# Patient Record
Sex: MALE | Race: WHITE | Employment: UNEMPLOYED | URBAN - METROPOLITAN AREA
[De-identification: names, ages, dates, MRNs, and addresses within clinical notes are randomized per-mention and may not be internally consistent; named-entity substitution may affect disease eponyms.]

---
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
## 2020-01-07 DIAGNOSIS — L70.9 ACNE: ICD-10-CM

## 2020-01-07 RX ORDER — MINOCYCLINE HYDROCHLORIDE 50 MG/1
50 TABLET ORAL 2 TIMES DAILY
Qty: 60 TABLET | Refills: 0 | Status: SHIPPED | OUTPATIENT
Start: 2020-01-07 | End: 2020-01-08

## 2020-01-08 RX ORDER — MINOCYCLINE HYDROCHLORIDE 50 MG/1
50 TABLET ORAL 2 TIMES DAILY
Qty: 60 TABLET | Refills: 0 | Status: SHIPPED | OUTPATIENT
Start: 2020-01-08 | End: 2020-07-21

## 2020-04-13 DIAGNOSIS — L70.9 ACNE, UNSPECIFIED ACNE TYPE: Primary | ICD-10-CM

## 2020-04-13 RX ORDER — MINOCYCLINE HYDROCHLORIDE 50 MG/1
50 TABLET ORAL DAILY
Qty: 90 TABLET | Refills: 0 | Status: SHIPPED | OUTPATIENT
Start: 2020-04-13 | End: 2020-12-31

## 2020-04-14 ENCOUNTER — TELEPHONE (OUTPATIENT)
Dept: INTERNAL MEDICINE | Facility: CLINIC | Age: 20
End: 2020-04-14

## 2020-04-14 NOTE — TELEPHONE ENCOUNTER
Fax received from [facility] requesting to change minocycline tablets to capsules due to cost. Verbal authorization given to change prescription from tablet to capsule form due to cost.    Myriam Aguilar) GOMEZ Denis

## 2020-07-21 DIAGNOSIS — R61 GENERALIZED HYPERHIDROSIS: Primary | ICD-10-CM

## 2020-07-21 DIAGNOSIS — L70.9 ACNE: ICD-10-CM

## 2020-07-21 RX ORDER — MINOCYCLINE HYDROCHLORIDE 50 MG/1
50 TABLET ORAL 2 TIMES DAILY
Qty: 60 TABLET | Refills: 0 | Status: SHIPPED | OUTPATIENT
Start: 2020-07-21 | End: 2020-09-29

## 2020-08-18 ENCOUNTER — OFFICE VISIT (OUTPATIENT)
Dept: FAMILY MEDICINE | Facility: CLINIC | Age: 20
End: 2020-08-18
Payer: COMMERCIAL

## 2020-08-18 VITALS
HEIGHT: 69 IN | HEART RATE: 71 BPM | OXYGEN SATURATION: 98 % | WEIGHT: 196.4 LBS | SYSTOLIC BLOOD PRESSURE: 116 MMHG | TEMPERATURE: 97.7 F | BODY MASS INDEX: 29.09 KG/M2 | DIASTOLIC BLOOD PRESSURE: 71 MMHG

## 2020-08-18 DIAGNOSIS — Z00.00 ROUTINE HISTORY AND PHYSICAL EXAMINATION OF ADULT: Primary | ICD-10-CM

## 2020-08-18 ASSESSMENT — MIFFLIN-ST. JEOR: SCORE: 1899.62

## 2020-08-18 ASSESSMENT — PAIN SCALES - GENERAL: PAINLEVEL: NO PAIN (0)

## 2020-08-18 NOTE — NURSING NOTE
Chief Complaint   Patient presents with     Physical     pt here for a physical       Anai Wytat CMA at 9:59 AM on 8/18/2020.

## 2020-08-18 NOTE — PATIENT INSTRUCTIONS
[facility] Medication Refill Request Information:  * Please contact your pharmacy regarding ANY request for medication refills.  ** [facility] Fax = [phone]  * Please allow 3 business days for routine medication refills.  * Please allow 5 business days for controlled substance medication refills.     [facility] Test Result notification information:  *You will be notified with in 7-10 days of your appointment day regarding the results of your test.  If you are on MyChart you will be notified as soon as the provider has reviewed the results and signed off on them.    [facility]: [phone]

## 2020-08-18 NOTE — PROGRESS NOTES
SUBJECTIVE:    Pt is a 19 year old male with pmh of     Patient Active Problem List   Diagnosis   (none) - all problems resolved or deleted       who is here for evaluation of had concerns including Physical (pt here for a physical).    Here for a physical  No c/o  Has a form for sports  No c/o knee, had surgery  Not using albuterol anymore even for exertion  Past Medical History:   Diagnosis Date     RSV (acute bronchiolitis due to respiratory syncytial virus)      Past Surgical History:   Procedure Laterality Date     ARTHROSCOPIC RECONSTRUCTION ANTERIOR CRUCIATE LIGAMENT Right 10/10/2018    Procedure: ARTHROSCOPIC RECONSTRUCTION ANTERIOR CRUCIATE LIGAMENT;  Right Knee Arthroscopic Anterior Cruciate Ligament Reconstruction with Bone Tendon Bone Autograft;  Surgeon: Praveen Ramírez MD;  Location: [facility] OR     HERNIA REPAIR       Family History   Problem Relation Age of Onset     Lipids Mother      Lipids Father      Lipids Maternal Grandfather      Cerebrovascular Disease Paternal Grandfather      Respiratory Other         mggrandmother     Asthma No family hx of      Allergies No family hx of      Melanoma No family hx of      Skin Cancer No family hx of      Current Outpatient Medications   Medication     albuterol (PROAIR HFA/PROVENTIL HFA/VENTOLIN HFA) 108 (90 Base) MCG/ACT inhaler     aluminum chloride (DRYSOL) 20 % external solution     minocycline (DYNACIN) 50 MG tablet     minocycline (DYNACIN) 50 MG tablet     No current facility-administered medications for this visit.      No Known Allergies  Social History     Socioeconomic History     Marital status: Single     Spouse name: Not on file     Number of children: Not on file     Years of education: Not on file     Highest education level: Not on file   Occupational History     Not on file   Social Needs     Financial resource strain: Not on file     Food insecurity     Worry: Not on file     Inability: Not on file     Transportation needs     Medical:  "Not on file     Non-medical: Not on file   Tobacco Use     Smoking status: Never Smoker     Smokeless tobacco: Never Used   Substance and Sexual Activity     Alcohol use: No     Drug use: No     Sexual activity: Never   Lifestyle     Physical activity     Days per week: Not on file     Minutes per session: Not on file     Stress: Not on file   Relationships     Social connections     Talks on phone: Not on file     Gets together: Not on file     Attends [employer] service: Not on file     Active member of club or organization: Not on file     Attends meetings of clubs or organizations: Not on file     Relationship status: Not on file     Intimate partner violence     Fear of current or ex partner: Not on file     Emotionally abused: Not on file     Physically abused: Not on file     Forced sexual activity: Not on file   Other Topics Concern     Parent/sibling w/ CABG, MI or angioplasty before 65F 55M? Not Asked   Social History Narrative     Not on file           Current Outpatient Medications   Medication Sig Dispense Refill     albuterol (PROAIR HFA/PROVENTIL HFA/VENTOLIN HFA) 108 (90 Base) MCG/ACT inhaler Take 2 puff 30 minutes prior to football practice or game 1 Inhaler 1     aluminum chloride (DRYSOL) 20 % external solution Apply topically At Bedtime 60 mL 11     minocycline (DYNACIN) 50 MG tablet Take 1 tablet (50 mg) by mouth 2 times daily Until acne is cleared then continue with 1 tablet (50 mg) by mouth daily. 60 tablet 0     minocycline (DYNACIN) 50 MG tablet Take 1 tablet (50 mg) by mouth daily 90 tablet 0       Social History     Tobacco Use     Smoking status: Never Smoker     Smokeless tobacco: Never Used   Substance Use Topics     Alcohol use: No     Drug use: No       Ten pt ROS completed, o/w neg    OBJECTIVE:  /71 (BP Location: Right arm, Patient Position: Sitting, Cuff Size: Adult Large)   Pulse 71   Temp 97.7  F (36.5  C) (Oral)   Ht 1.758 m (5' 9.21\")   Wt 89.1 kg (196 lb 6.4 oz)   " SpO2 98%   BMI 28.82 kg/m    GENERAL APPEARANCE: Alert, no acute distress  EYES: PERRL, EOM normal, conjunctiva and lids normal  HENT: Ears and TMs normal, oral mucosa and posterior oropharynx normal  NECK: No adenopathy,masses or thyromegaly  RESP: lungs clear to auscultation   CV: normal rate, regular rhythm, no murmur or gallop  ABDOMEN: soft, no organomegaly, masses or tenderness   (male): penis, scrotum, testes normal, no hernias  LYMPHATICS: No cervical, supraclavicular or inguinal adenopathy  MS: extremities normal, no peripheral edema  NEURO: Alert, oriented, speech and mentation normal  PSYCHE: mentation appears normal, affect and mood normal    ASSESSMENT/PLAN:    Form done that he is normal, given to him, for sports, full play ok'd  Shots utd, will get flu shot at [facility]      RONALD SUTTON MD

## 2020-09-29 DIAGNOSIS — L70.9 ACNE: ICD-10-CM

## 2020-09-29 RX ORDER — MINOCYCLINE HYDROCHLORIDE 50 MG/1
50 CAPSULE ORAL DAILY
Qty: 90 CAPSULE | Refills: 1 | Status: SHIPPED | OUTPATIENT
Start: 2020-09-29 | End: 2020-12-31

## 2020-09-29 RX ORDER — MINOCYCLINE HYDROCHLORIDE 50 MG/1
50 TABLET ORAL DAILY
Qty: 90 TABLET | Refills: 1 | Status: SHIPPED | OUTPATIENT
Start: 2020-09-29 | End: 2020-09-29

## 2020-09-29 NOTE — TELEPHONE ENCOUNTER
Received a fax from the pharmacy that minocycline capsules are much cheaper that the tablets. Sent the new Rx with capsules.

## 2020-12-31 DIAGNOSIS — L70.9 ACNE: ICD-10-CM

## 2020-12-31 RX ORDER — MINOCYCLINE HYDROCHLORIDE 50 MG/1
50 CAPSULE ORAL DAILY
Qty: 90 CAPSULE | Refills: 3 | Status: SHIPPED | OUTPATIENT
Start: 2020-12-31

## 2020-12-31 NOTE — TELEPHONE ENCOUNTER
Patient is needing a refill of acne medication. Pended this encounter for provider to review. Marivel Ascencio LPN 12/31/2020 12:44 PM

## 2021-06-02 VITALS — WEIGHT: 182.1 LBS | BODY MASS INDEX: 27.69 KG/M2

## 2021-06-17 NOTE — PATIENT INSTRUCTIONS - HE
Patient Instructions by Memo Valladares PA-C at 3/29/2019  3:20 PM     Author: Memo Valladares PA-C Service: -- Author Type: Physician Assistant    Filed: 3/29/2019  4:01 PM Encounter Date: 3/29/2019 Status: Addendum    : Memo Valladares PA-C (Physician Assistant)    Related Notes: Original Note by Memo Valladares PA-C (Physician Assistant) filed at 3/29/2019  4:01 PM       Suggested increased rest increased fluids and bedside humidification  Over-the-counter Tylenol for comfort.  Follow packaging directions  Over-the-counter throat lozenges with benzocaine such as Cepacol may be used if indicated and is not a choking hazard based on age.  Follow packaging directions.  Do not overuse the benzocaine as it will dry the throat and make it uncomfortable.  Follow up with primary care provider if you do not get resolution with the course of treatment.  Return to walk-in care if complication or new symptoms arise in the interim.          Self-Care for Sore Throats  Sore throats happen for many reasons, such as colds, allergies, and infections caused by viruses or bacteria. In any case, your throat becomes red and sore. Your goal for self-care is to reduce your discomfort while giving your throat a chance to heal.    Moisten and soothe your throat  Tips include the following:    Try a sip of water first thing after waking up.    Keep your throat moist by drinking 6 or more glasses of clear liquids every day.    Run a cool-air humidifier in your room overnight.    Avoid cigarette smoke.     Suck on throat lozenges, cough drops, hard candy, ice chips, or frozen fruit-juice bars. Use the sugar-free versions if your diet or medical condition requires them.  Gargle to ease irritation  Gargling every hour or 2 can ease irritation. Try gargling with 1 of these solutions:    1/4 teaspoon of salt in 1/2 cup of warm water    An over-the-counter anesthetic gargle  Use medicine for more relief  Over-the-counter medicine can reduce sore  throat symptoms. Ask your pharmacist if you have questions about which medicine to use:    Ease pain with anesthetic sprays. Aspirin or an aspirin substitute also helps. Remember, never give aspirin to anyone 18 or younger, or if you are already taking blood thinners.     For sore throats caused by allergies, try antihistamines to block the allergic reaction.    Remember: unless a sore throat is caused by a bacterial infection, antibiotics wont help you.  Prevent future sore throats  Prevention tips include the following:    Stop smoking or reduce contact with secondhand smoke. Smoke irritates the tender throat lining.    Limit contact with pets and with allergy-causing substances, such as pollen and mold.    When youre around someone with a sore throat or cold, wash your hands often to keep viruses or bacteria from spreading.    Dont strain your vocal cords.  Call your healthcare provider  Contact your healthcare provider if you have:    A temperature over 101 F (38.3 C)    White spots on the throat    Great difficulty swallowing    Trouble breathing    A skin rash    Recent exposure to someone else with strep bacteria    Severe hoarseness and swollen glands in the neck or jaw   Date Last Reviewed: 8/1/2016 2000-2016 [employer]. [address] [place] [address]. All rights reserved. This information is not intended as a substitute for professional medical care. Always follow your healthcare professional's instructions.        Patient Education     Hand, Foot, and Mouth Disease (Child)    Hand, foot, and mouth disease (HFMD) is an illness caused by a virus. It is usually seen in young children. This virus causes small ulcers in the mouth (throat, lips, cheeks, gums, and tongue) and small blisters or red spots may appear on the palms (hands), diaper area, and soles of the feet. There is usually a low-grade fever and poor appetite. HFMD is not a serious illness and usually go away in 1 to 2  weeks. The painful sores in the mouth may prevent your child from eating and drinking.  It takes 3 to 5 days for the illness to appear in an exposed child. Generally, the HFMD is the most contagious during the first week of the illness. Sometimes, people can be contagious for days or weeks after the symptoms have disappeared.  HFMD can be transmitted from person to person by:    Touching your nose, mouth, eye after touching the stool of an infected person (has the virus)    Touching your nose, mouth, eye after touching fluid from the blisters/sores of an infected person    Respiratory secretions (sneezing, coughing, blowing your nose)    Touching contaminated objects (toys, doorknobs)    Oral secretions (kissing)  Home care  Mouth pain  Unless your healthcare provider has prescribed another medicine for mouth pain:    Acetaminophen or ibuprofen may be used for pain or discomfort or fever. Please consult your child's healthcare provider before giving your child acetaminophen or ibuprofen for dosing instructions and when to give the medicine (schedule).  Do not give ibuprofen to an infant 6 months of age or younger. If your child has chronic liver or kidney disease or ever had a stomach ulcer or gastrointestinal bleeding, talk with your healthcare provider before using these medicines. Never give aspirin to anyone under 18 years of age who has a fever. It may cause severe disease (Reye Syndrome) or death. Talk to your child's healthcare provider before giving him or her over-the counter medicines.    Liquid rinses may be used in children over 12 months of age. Ask your child's healthcare provider for instructions.  Feeding  Follow a soft diet with plenty of fluids to prevent dehydration. If your child doesn't want to eat solid foods, it's OK for a few days, as long as he or she drinks lots of fluid. Cool drinks and frozen treats (sherbet) are soothing and easier to take. Avoid citrus juices (orange juice, lemonade,  etc.) and salty or spicy foods. These may cause more pain in the mouth sores.  Return to  or school  Children may usually return to day care or school once the fever is gone and they are eating and drinking well. Contact your healthcare provider and ask when your child is able to return to  or school.  Follow up  Follow up with your child's healthcare provider, or as advised.  When to seek medical advice  Call your child's healthcare provider right away if any of these occur:    Your child complains of pain in the back of the neck    Your child has a severe headache or continued vomiting    Your child is having trouble breathing    Your child is drowsy or has trouble staying awake    Your child is having trouble swallowing    Mouth ulcers are present after 2 weeks    Your child's symptoms are getting worse    Your child appears to be dehydrated (dry mouth, no tears, haven' t urinated is 8 or more hours)    Your child has a fever (see Fever and children, below)  Call 911  Call 911 if any of these occur:    Unusual fussiness, drowsiness, or confusion    Severe headache or vomiting that continues    Trouble breathing    Seizures  Fever and children  Always use a digital thermometer to check your julian temperature. Never use a mercury thermometer.  For infants and toddlers, be sure to use a rectal thermometer correctly. A rectal thermometer may accidentally poke a hole in (perforate) the rectum. It may also pass on germs from the stool. Always follow the product makers directions for proper use. If you dont feel comfortable taking a rectal temperature, use another method. When you talk to your julian healthcare provider, tell him or her which method you used to take your julian temperature.  Here are guidelines for fever temperature. Ear temperatures arent accurate before 6 months of age. Dont take an oral temperature until your child is at least 4 years old.  Infant under 3 months old:    Ask your julian  healthcare provider how you should take the temperature.    Rectal or forehead (temporal artery) temperature of 100.4 F (38 C) or higher, or as directed by the provider    Armpit temperature of 99 F (37.2 C) or higher, or as directed by the provider  Child age 3 to 36 months:    Rectal, forehead (temporal artery), or ear temperature of 102 F (38.9 C) or higher, or as directed by the provider    Armpit temperature of 101 F (38.3 C) or higher, or as directed by the provider  Child of any age:    Repeated temperature of 104 F (40 C) or higher, or as directed by the provider    Fever that lasts more than 24 hours in a child under 2 years old. Or a fever that lasts for 3 days in a child 2 years or older.   Date Last Reviewed: 11/1/2017 2000-2017 [employer]. [address] [place] [address], [place] [address]. All rights reserved. This information is not intended as a substitute for professional medical care. Always follow your healthcare professional's instructions.

## 2021-06-27 NOTE — PROGRESS NOTES
"Progress Notes by Memo Valladares PA-C at 3/29/2019  3:20 PM     Author: Memo Valladares PA-C Service: -- Author Type: Physician Assistant    Filed: 3/29/2019  7:17 PM Encounter Date: 3/29/2019 Status: Signed    : Memo Valladares PA-C (Physician Assistant)       Subjective:      Patient ID: Kemal Mendoza is a 18 y.o. male.    Chief Complaint:    HPI  Kemal Mendoza is a 18 y.o. male who presents today complaining of five day acute onset of sore throat and odynophagia.  Patient denies fever, chills, night sweats, fatigue, vomiting, diarrhea, skin rash, abdominal pain or urinary symptoms.      No known sick contacts for strep throat.    Has not tried treatment for this over-the-counter.    Patient was seen earlier in the week and did have a strep test that was negative and a culture that was also returned as negative.  Patient is back here today ostensibly stating that his throat still hurts he wants to be \"retested for strep.\"    No past medical history on file.    No past surgical history on file.    No family history on file.    Social History     Tobacco Use   ? Smoking status: Never Smoker   ? Smokeless tobacco: Never Used   Substance Use Topics   ? Alcohol use: Not on file   ? Drug use: Not on file       Review of Systems  As above in HPI, otherwise balance of Review of Systems are negative.    Objective:     /78 (Patient Site: Right Arm, Patient Position: Sitting, Cuff Size: Adult Regular)   Pulse 85   Temp 99.2  F (37.3  C) (Oral)   Resp 20   Wt 182 lb 1.6 oz (82.6 kg)   SpO2 97%     Physical Exam  General: Patient is resting comfortably no acute distress is afebrile  HEENT: Head is normocephalic atraumatic   eyes are PERRL EOMI sclera anicteric   TMs are clear bilaterally  Throat is without pharyngeal wall erythema and no exudate.  The right upper soft palate there is a small aphthous ulcer consistent with coxsackie virus no noted lesions on the buccal mucosa over the tongue  No cervical " lymphadenopathy present  LUNGS: Clear to auscultation bilaterally  HEART: Regular rate and rhythm  Skin: Without rash non-diaphoretic    Lab:  Recent Results (from the past 24 hour(s))   Rapid Strep A Screen-Throat   Result Value Ref Range    Rapid Strep A Antigen No Group A Strep detected, presumptive negative No Group A Strep detected, presumptive negative       Assessment:     Procedures    The primary encounter diagnosis was Herpangina. A diagnosis of Throat pain was also pertinent to this visit.    Plan:     1. Herpangina     2. Throat pain  Rapid Strep A Screen-Throat    Group A Strep, RNA Direct Detection, Throat       Had a conversation with the patient stating the most likely has herpangina.  This is viral in nature will be self-limited.  Suggested symptomatic care and self-limited illness.  Questions were answered to patient's satisfaction before discharge.  He did not need a note for work today.    Patient Instructions     Suggested increased rest increased fluids and bedside humidification  Over-the-counter Tylenol for comfort.  Follow packaging directions  Over-the-counter throat lozenges with benzocaine such as Cepacol may be used if indicated and is not a choking hazard based on age.  Follow packaging directions.  Do not overuse the benzocaine as it will dry the throat and make it uncomfortable.  Follow up with primary care provider if you do not get resolution with the course of treatment.  Return to walk-in care if complication or new symptoms arise in the interim.          Self-Care for Sore Throats  Sore throats happen for many reasons, such as colds, allergies, and infections caused by viruses or bacteria. In any case, your throat becomes red and sore. Your goal for self-care is to reduce your discomfort while giving your throat a chance to heal.    Moisten and soothe your throat  Tips include the following:    Try a sip of water first thing after waking up.    Keep your throat moist by  drinking 6 or more glasses of clear liquids every day.    Run a cool-air humidifier in your room overnight.    Avoid cigarette smoke.     Suck on throat lozenges, cough drops, hard candy, ice chips, or frozen fruit-juice bars. Use the sugar-free versions if your diet or medical condition requires them.  Gargle to ease irritation  Gargling every hour or 2 can ease irritation. Try gargling with 1 of these solutions:    1/4 teaspoon of salt in 1/2 cup of warm water    An over-the-counter anesthetic gargle  Use medicine for more relief  Over-the-counter medicine can reduce sore throat symptoms. Ask your pharmacist if you have questions about which medicine to use:    Ease pain with anesthetic sprays. Aspirin or an aspirin substitute also helps. Remember, never give aspirin to anyone 18 or younger, or if you are already taking blood thinners.     For sore throats caused by allergies, try antihistamines to block the allergic reaction.    Remember: unless a sore throat is caused by a bacterial infection, antibiotics wont help you.  Prevent future sore throats  Prevention tips include the following:    Stop smoking or reduce contact with secondhand smoke. Smoke irritates the tender throat lining.    Limit contact with pets and with allergy-causing substances, such as pollen and mold.    When youre around someone with a sore throat or cold, wash your hands often to keep viruses or bacteria from spreading.    Dont strain your vocal cords.  Call your healthcare provider  Contact your healthcare provider if you have:    A temperature over 101 F (38.3 C)    White spots on the throat    Great difficulty swallowing    Trouble breathing    A skin rash    Recent exposure to someone else with strep bacteria    Severe hoarseness and swollen glands in the neck or jaw   Date Last Reviewed: 8/1/2016 2000-2016 [employer]. [address] [place] [address], [place] [address]. All rights reserved. This information is not intended as  a substitute for professional medical care. Always follow your healthcare professional's instructions.        Patient Education     Hand, Foot, and Mouth Disease (Child)    Hand, foot, and mouth disease (HFMD) is an illness caused by a virus. It is usually seen in young children. This virus causes small ulcers in the mouth (throat, lips, cheeks, gums, and tongue) and small blisters or red spots may appear on the palms (hands), diaper area, and soles of the feet. There is usually a low-grade fever and poor appetite. HFMD is not a serious illness and usually go away in 1 to 2 weeks. The painful sores in the mouth may prevent your child from eating and drinking.  It takes 3 to 5 days for the illness to appear in an exposed child. Generally, the HFMD is the most contagious during the first week of the illness. Sometimes, people can be contagious for days or weeks after the symptoms have disappeared.  HFMD can be transmitted from person to person by:    Touching your nose, mouth, eye after touching the stool of an infected person (has the virus)    Touching your nose, mouth, eye after touching fluid from the blisters/sores of an infected person    Respiratory secretions (sneezing, coughing, blowing your nose)    Touching contaminated objects (toys, doorknobs)    Oral secretions (kissing)  Home care  Mouth pain  Unless your healthcare provider has prescribed another medicine for mouth pain:    Acetaminophen or ibuprofen may be used for pain or discomfort or fever. Please consult your child's healthcare provider before giving your child acetaminophen or ibuprofen for dosing instructions and when to give the medicine (schedule).  Do not give ibuprofen to an infant 6 months of age or younger. If your child has chronic liver or kidney disease or ever had a stomach ulcer or gastrointestinal bleeding, talk with your healthcare provider before using these medicines. Never give aspirin to anyone under 18 years of age who has a  fever. It may cause severe disease (Reye Syndrome) or death. Talk to your child's healthcare provider before giving him or her over-the counter medicines.    Liquid rinses may be used in children over 12 months of age. Ask your child's healthcare provider for instructions.  Feeding  Follow a soft diet with plenty of fluids to prevent dehydration. If your child doesn't want to eat solid foods, it's OK for a few days, as long as he or she drinks lots of fluid. Cool drinks and frozen treats (sherbet) are soothing and easier to take. Avoid citrus juices (orange juice, lemonade, etc.) and salty or spicy foods. These may cause more pain in the mouth sores.  Return to  or school  Children may usually return to day care or school once the fever is gone and they are eating and drinking well. Contact your healthcare provider and ask when your child is able to return to  or school.  Follow up  Follow up with your child's healthcare provider, or as advised.  When to seek medical advice  Call your child's healthcare provider right away if any of these occur:    Your child complains of pain in the back of the neck    Your child has a severe headache or continued vomiting    Your child is having trouble breathing    Your child is drowsy or has trouble staying awake    Your child is having trouble swallowing    Mouth ulcers are present after 2 weeks    Your child's symptoms are getting worse    Your child appears to be dehydrated (dry mouth, no tears, haven' t urinated is 8 or more hours)    Your child has a fever (see Fever and children, below)  Call 911  Call 911 if any of these occur:    Unusual fussiness, drowsiness, or confusion    Severe headache or vomiting that continues    Trouble breathing    Seizures  Fever and children  Always use a digital thermometer to check your julian temperature. Never use a mercury thermometer.  For infants and toddlers, be sure to use a rectal thermometer correctly. A rectal  thermometer may accidentally poke a hole in (perforate) the rectum. It may also pass on germs from the stool. Always follow the product makers directions for proper use. If you dont feel comfortable taking a rectal temperature, use another method. When you talk to your julian healthcare provider, tell him or her which method you used to take your julian temperature.  Here are guidelines for fever temperature. Ear temperatures arent accurate before 6 months of age. Dont take an oral temperature until your child is at least 4 years old.  Infant under 3 months old:    Ask your julian healthcare provider how you should take the temperature.    Rectal or forehead (temporal artery) temperature of 100.4 F (38 C) or higher, or as directed by the provider    Armpit temperature of 99 F (37.2 C) or higher, or as directed by the provider  Child age 3 to 36 months:    Rectal, forehead (temporal artery), or ear temperature of 102 F (38.9 C) or higher, or as directed by the provider    Armpit temperature of 101 F (38.3 C) or higher, or as directed by the provider  Child of any age:    Repeated temperature of 104 F (40 C) or higher, or as directed by the provider    Fever that lasts more than 24 hours in a child under 2 years old. Or a fever that lasts for 3 days in a child 2 years or older.   Date Last Reviewed: 11/1/2017 2000-2017 The "[employer]. [address] [place] [address], [place] [address]. All rights reserved. This information is not intended as a substitute for professional medical care. Always follow your healthcare professional's instructions.

## 2021-07-06 NOTE — TELEPHONE ENCOUNTER
DIAGNOSIS: Right fingers greater than left, numbness and shooting pain   APPOINTMENT DATE: 7.8.21   NOTES STATUS DETAILS   OFFICE NOTE from referring provider N/A    OFFICE NOTE from other specialist N/A    DISCHARGE SUMMARY from hospital N/A    DISCHARGE REPORT from the ER N/A    OPERATIVE REPORT N/A    EMG report N/A    MEDICATION LIST Internal    MRI N/A    DEXA (osteoporosis/bone health) N/A    CT SCAN N/A    XRAYS (IMAGES & REPORTS) N/A

## 2021-07-08 ENCOUNTER — PRE VISIT (OUTPATIENT)
Dept: ORTHOPEDICS | Facility: CLINIC | Age: 21
End: 2021-07-08

## 2021-07-08 ENCOUNTER — OFFICE VISIT (OUTPATIENT)
Dept: ORTHOPEDICS | Facility: CLINIC | Age: 21
End: 2021-07-08
Payer: COMMERCIAL

## 2021-07-08 VITALS — BODY MASS INDEX: 25.77 KG/M2 | WEIGHT: 180 LBS | HEIGHT: 70 IN

## 2021-07-08 DIAGNOSIS — G56.21 ULNAR NERVE COMPRESSION, RIGHT: Primary | ICD-10-CM

## 2021-07-08 PROCEDURE — 99202 OFFICE O/P NEW SF 15 MIN: CPT | Performed by: FAMILY MEDICINE

## 2021-07-08 ASSESSMENT — MIFFLIN-ST. JEOR: SCORE: 1832.72

## 2021-07-08 NOTE — LETTER
7/8/2021      RE: Kemal Mendoza  [address]  [place] [address]       Sports Medicine Clinic Visit    PCP: Roderick Vallecillo    Kemal Mendoza is a 20 year old male who is seen  as self referral presenting with right forearm numbness and shooting pain after waking up and weight training.  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  It would require angles of stooping and pulling to accomplish it.  He will sometimes be pulling 500 feet of cable at a time.  In the weight room he will usually do bench pressing and overhead press.  He does not do a lot of other upper extremity exercises.  At home he plays video games but infrequently.  He is not aware of other situations where he props on the elbow.  He will of a tendency to notice discomfort along the ulnar aspect of the forearm and into the fifth fourth and third fingers of the right hand more predominantly.  He will notice some tingling in this distribution when he wakes up in the morning.  He describes the problem as mild.    Injury:     Location of Pain: Right forearm  Duration of Pain: 1.5 month(s)  Rating of Pain: 5/10  Pain is better with: Nothing  Pain is worse with: Weight training  Additional Features: Numbness, tingling  Treatment so far consists of: Ice, Tylenol, Ibuprofen and Rest  Prior History of related problems: N/A    There were no vitals taken for this visit.          PMH:  Past Medical History:   Diagnosis Date     RSV (acute bronchiolitis due to respiratory syncytial virus)    right knee acl reconstruction 2018    Active problem list:  Patient Active Problem List   Diagnosis   (none) - all problems resolved or deleted       FH:  Family History   Problem Relation Age of Onset     Lipids Mother      Lipids Father      Lipids Maternal Grandfather      Cerebrovascular Disease Paternal Grandfather      Respiratory Other         mggrandmother     Asthma No family hx of      Allergies No family hx of       Melanoma No family hx of      Skin Cancer No family hx of        SH:  Social History     Socioeconomic History     Marital status: Single     Spouse name: Not on file     Number of children: Not on file     Years of education: Not on file     Highest education level: Not on file   Occupational History     Not on file   Social Needs     Financial resource strain: Not on file     Food insecurity     Worry: Not on file     Inability: Not on file     Transportation needs     Medical: Not on file     Non-medical: Not on file   Tobacco Use     Smoking status: Never Smoker     Smokeless tobacco: Never Used   Substance and Sexual Activity     Alcohol use: No     Drug use: No     Sexual activity: Never   Lifestyle     Physical activity     Days per week: Not on file     Minutes per session: Not on file     Stress: Not on file   Relationships     Social connections     Talks on phone: Not on file     Gets together: Not on file     Attends Yazidism service: Not on file     Active member of club or organization: Not on file     Attends meetings of clubs or organizations: Not on file     Relationship status: Not on file     Intimate partner violence     Fear of current or ex partner: Not on file     Emotionally abused: Not on file     Physically abused: Not on file     Forced sexual activity: Not on file   Other Topics Concern     Parent/sibling w/ CABG, MI or angioplasty before 65F 55M? Not Asked   Social History Narrative     Not on file       MEDS:  See EMR, reviewed  ALL:  See EMR, reviewed    REVIEW OF SYSTEMS:  CONSTITUTIONAL:NEGATIVE for fever, chills, change in weight  INTEGUMENTARY/SKIN: NEGATIVE for worrisome rashes, moles or lesions  EYES: NEGATIVE for vision changes or irritation  ENT/MOUTH: NEGATIVE for ear, mouth and throat problems  RESP:NEGATIVE for significant cough or SOB  BREAST: NEGATIVE for masses, tenderness or discharge  CV: NEGATIVE for chest pain, palpitations or peripheral edema  GI: NEGATIVE for  nausea, abdominal pain, heartburn, or change in bowel habits  :NEGATIVE for frequency, dysuria, or hematuria  :NEGATIVE for frequency, dysuria, or hematuria  NEURO: NEGATIVE for weakness, dizziness or paresthesias  ENDOCRINE: NEGATIVE for temperature intolerance, skin/hair changes  HEME/ALLERGY/IMMUNE: NEGATIVE for bleeding problems  PSYCHIATRIC: NEGATIVE for changes in mood or affect          Objective there is no impingement signs at the shoulder.  He has full range of motion at the neck.  He denies neck discomfort.  Strength is intact bilaterally in the upper extremities 5 out of 5 deltoid supraspinatus infraspinatus subscapularis biceps triceps forearm flexion extension, grasp, and digit he minimi strength.  He is nontender at the ulnar groove.  Full range of motion at the elbow.  Phalen's sign reproduces some tingling but more into the ulnar aspect of the hand.        Assessment right-sided forearm and hand discomfort suspect cubital tunnel versus ulnar nerve compression    Plan: We discussed the use of an elbow night splint over the next 3 to 4 weeks.  He plans on purchasing an elbow pad and using it in the antecubital fossa.  He was given a set of nerve glide exercises to do prior to his shift and after his shift.  He will try not to prop with the elbow while driving or at home with videogames.  He will follow-up if not improved an EMG of the upper extremity could be considered.        Craig Romero MD

## 2021-07-08 NOTE — PROGRESS NOTES
Sports Medicine Clinic Visit    PCP: Roderick Vallecillo    Kemal Mendoza is a 20 year old male who is seen  as self referral presenting with right forearm numbness and shooting pain after waking up and weight training.  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  It would require angles of stooping and pulling to accomplish it.  He will sometimes be pulling 500 feet of cable at a time.  In the weight room he will usually do bench pressing and overhead press.  He does not do a lot of other upper extremity exercises.  At home he plays video games but infrequently.  He is not aware of other situations where he props on the elbow.  He will of a tendency to notice discomfort along the ulnar aspect of the forearm and into the fifth fourth and third fingers of the right hand more predominantly.  He will notice some tingling in this distribution when he wakes up in the morning.  He describes the problem as mild.    Injury:     Location of Pain: Right forearm  Duration of Pain: 1.5 month(s)  Rating of Pain: 5/10  Pain is better with: Nothing  Pain is worse with: Weight training  Additional Features: Numbness, tingling  Treatment so far consists of: Ice, Tylenol, Ibuprofen and Rest  Prior History of related problems: N/A    There were no vitals taken for this visit.          PMH:  Past Medical History:   Diagnosis Date     RSV (acute bronchiolitis due to respiratory syncytial virus)    right knee acl reconstruction 2018    Active problem list:  Patient Active Problem List   Diagnosis   (none) - all problems resolved or deleted       FH:  Family History   Problem Relation Age of Onset     Lipids Mother      Lipids Father      Lipids Maternal Grandfather      Cerebrovascular Disease Paternal Grandfather      Respiratory Other         mggrandmother     Asthma No family hx of      Allergies No family hx of      Melanoma No family hx of      Skin Cancer No family hx of        SH:  Social  History     Socioeconomic History     Marital status: Single     Spouse name: Not on file     Number of children: Not on file     Years of education: Not on file     Highest education level: Not on file   Occupational History     Not on file   Social Needs     Financial resource strain: Not on file     Food insecurity     Worry: Not on file     Inability: Not on file     Transportation needs     Medical: Not on file     Non-medical: Not on file   Tobacco Use     Smoking status: Never Smoker     Smokeless tobacco: Never Used   Substance and Sexual Activity     Alcohol use: No     Drug use: No     Sexual activity: Never   Lifestyle     Physical activity     Days per week: Not on file     Minutes per session: Not on file     Stress: Not on file   Relationships     Social connections     Talks on phone: Not on file     Gets together: Not on file     Attends Cheondoism service: Not on file     Active member of club or organization: Not on file     Attends meetings of clubs or organizations: Not on file     Relationship status: Not on file     Intimate partner violence     Fear of current or ex partner: Not on file     Emotionally abused: Not on file     Physically abused: Not on file     Forced sexual activity: Not on file   Other Topics Concern     Parent/sibling w/ CABG, MI or angioplasty before 65F 55M? Not Asked   Social History Narrative     Not on file       MEDS:  See EMR, reviewed  ALL:  See EMR, reviewed    REVIEW OF SYSTEMS:  CONSTITUTIONAL:NEGATIVE for fever, chills, change in weight  INTEGUMENTARY/SKIN: NEGATIVE for worrisome rashes, moles or lesions  EYES: NEGATIVE for vision changes or irritation  ENT/MOUTH: NEGATIVE for ear, mouth and throat problems  RESP:NEGATIVE for significant cough or SOB  BREAST: NEGATIVE for masses, tenderness or discharge  CV: NEGATIVE for chest pain, palpitations or peripheral edema  GI: NEGATIVE for nausea, abdominal pain, heartburn, or change in bowel habits  :NEGATIVE for  frequency, dysuria, or hematuria  :NEGATIVE for frequency, dysuria, or hematuria  NEURO: NEGATIVE for weakness, dizziness or paresthesias  ENDOCRINE: NEGATIVE for temperature intolerance, skin/hair changes  HEME/ALLERGY/IMMUNE: NEGATIVE for bleeding problems  PSYCHIATRIC: NEGATIVE for changes in mood or affect          Objective there is no impingement signs at the shoulder.  He has full range of motion at the neck.  He denies neck discomfort.  Strength is intact bilaterally in the upper extremities 5 out of 5 deltoid supraspinatus infraspinatus subscapularis biceps triceps forearm flexion extension, grasp, and digit he minimi strength.  He is nontender at the ulnar groove.  Full range of motion at the elbow.  Phalen's sign reproduces some tingling but more into the ulnar aspect of the hand.        Assessment right-sided forearm and hand discomfort suspect cubital tunnel versus ulnar nerve compression    Plan: We discussed the use of an elbow night splint over the next 3 to 4 weeks.  He plans on purchasing an elbow pad and using it in the antecubital fossa.  He was given a set of nerve glide exercises to do prior to his shift and after his shift.  He will try not to prop with the elbow while driving or at home with videogames.  He will follow-up if not improved an EMG of the upper extremity could be considered.

## 2022-10-26 NOTE — NURSING NOTE
"Chief Complaint   Patient presents with     Urgent Care     Derm Problem     Infection on left leg, swollen, red, painful to touch.        Initial Pulse 78  Temp(Src) 97.7  F (36.5  C) (Tympanic)  Wt 164 lb (74.39 kg)  SpO2 99% Estimated body mass index is 37.42 kg/(m^2) as calculated from the following:    Height as of 9/14/11: 4' 7.5\" (1.41 m).    Weight as of this encounter: 164 lb (74.39 kg).      [place], ANTONIETA    " Was notified by MA that pt received depo-provera (150mg) and not T-Dap that was ordered by EMB. I notified [facility] of error. Spoke with both [facility] and CAP who notified Dr. Kiet Smith MFM. Pt was seeing MFM after PN appt today. Called pt and notified pt of medication error. Informed pt Dr. Kiet Smith is aware as CAP spoke with him. EMB spoke with pt. Pt states she will call back if she has any further questions. Pt very appreciative and verbalized understanding. EMB notified CARLOTTA. CARLOTTA will call pt today as f/u.

## (undated) DEVICE — DRSG ABDOMINAL 07 1/2X8" 7197D

## (undated) DEVICE — DRAPE IOBAN INCISE 23X17" 6650EZ

## (undated) DEVICE — SU VICRYL 0 CT 36" J358H

## (undated) DEVICE — BLADE SAW SAGITTAL STRK XSHORT 25X9.5X0.6MM 2108-145-000

## (undated) DEVICE — DRSG ADAPTIC 3X8" 6113

## (undated) DEVICE — LINEN GOWN XLG 5407

## (undated) DEVICE — SU VICRYL 2-0 CT-1 27" UND J259H

## (undated) DEVICE — TUBING SUCTION MEDI-VAC 1/4"X20' N620A

## (undated) DEVICE — PEN MARKING SKIN W/PAPER RULER 31145785

## (undated) DEVICE — BLADE KNIFE SURG 10 371110

## (undated) DEVICE — SOL NACL 0.9% IRRIG 1000ML BOTTLE 2F7124

## (undated) DEVICE — STRAP KNEE/BODY 31143004

## (undated) DEVICE — GLOVE PROTEXIS W/NEU-THERA 7.0  2D73TE70

## (undated) DEVICE — PIN GUIDE ARTHREX 2.4MM DRILL  AR-1250L

## (undated) DEVICE — LINEN TOWEL PACK X5 5464

## (undated) DEVICE — COVER CAMERA IN-LIGHT DISP LT-C02

## (undated) DEVICE — GLOVE PROTEXIS POWDER FREE 7.5 ORTHOPEDIC 2D73ET75

## (undated) DEVICE — Device

## (undated) DEVICE — GOWN XLG DISP 9545

## (undated) DEVICE — LINEN ORTHO PACK 5446

## (undated) DEVICE — BNDG SPANDAGRIP SZ J LF BEIGE 6.75" SAG13117

## (undated) DEVICE — ESU ELEC VAPR PREMIER RF 90DEG 3.7MM 227204

## (undated) DEVICE — DRSG GAUZE 4X8" NON21842

## (undated) DEVICE — SOL WATER IRRIG 1000ML BOTTLE 2F7114

## (undated) DEVICE — SU ETHILON 4-0 PS-2 18" BLACK 1667H

## (undated) DEVICE — SUCTION MANIFOLD DORNOCH ULTRA CART UL-CL500

## (undated) DEVICE — DECANTER TRANSFER DEVICE 2008S

## (undated) DEVICE — BLADE SHAVER ARTHRO 4.2MM FULL RADIUS 9247A

## (undated) DEVICE — ESU GROUND PAD ADULT W/CORD E7507

## (undated) DEVICE — ESU PENCIL W/SMOKE EVAC NEPTUNE STRYKER 0703-046-000

## (undated) DEVICE — DRSG STERI STRIP 1/2X4" R1547

## (undated) DEVICE — TUBING ARTHRO CONMED/LINVATEC PUMP BLUE INFLOW 10K100

## (undated) DEVICE — SU ETHIBOND 2 V-37 4X30" MX69G

## (undated) DEVICE — SU MONOCRYL 3-0 PS-1 27" Y936H

## (undated) DEVICE — NDL 18GA 1.5" 305196

## (undated) DEVICE — SOL NACL 0.9% IRRIG 3000ML BAG 2B7477

## (undated) DEVICE — PACK ACL SUPPLEMENT STD

## (undated) DEVICE — SU VICRYL 0 CT-1 27" UND J260H

## (undated) DEVICE — SYR 10ML FINGER CONTROL W/O NDL 309695

## (undated) DEVICE — IMM KNEE 20" 0814-2660

## (undated) DEVICE — SU FIBERWIRE 2 38"  AR-7200

## (undated) DEVICE — GLOVE PROTEXIS POWDER FREE 7.0 ORTHOPEDIC 2D73ET70

## (undated) DEVICE — SPONGE LAP 18X18" X8435

## (undated) DEVICE — REAMER ARTHREX LOW PROFILE 10MM  AR-1410LP

## (undated) DEVICE — DRAPE U-DRAPE 1015NSD NON-STERILE

## (undated) DEVICE — GLOVE PROTEXIS W/NEU-THERA 7.5  2D73TE75

## (undated) DEVICE — PIN GUIDE ARTHREX 2.4MM W/EYE BEATH PIN AR-1297L

## (undated) RX ORDER — KETOROLAC TROMETHAMINE 30 MG/ML
INJECTION, SOLUTION INTRAMUSCULAR; INTRAVENOUS
Status: DISPENSED
Start: 2018-10-10

## (undated) RX ORDER — OXYCODONE HYDROCHLORIDE 5 MG/1
TABLET ORAL
Status: DISPENSED
Start: 2018-10-10

## (undated) RX ORDER — FENTANYL CITRATE 50 UG/ML
INJECTION, SOLUTION INTRAMUSCULAR; INTRAVENOUS
Status: DISPENSED
Start: 2018-10-10

## (undated) RX ORDER — BUPIVACAINE HYDROCHLORIDE AND EPINEPHRINE 2.5; 5 MG/ML; UG/ML
INJECTION, SOLUTION EPIDURAL; INFILTRATION; INTRACAUDAL; PERINEURAL
Status: DISPENSED
Start: 2018-10-10

## (undated) RX ORDER — CEFAZOLIN SODIUM 1 G/3ML
INJECTION, POWDER, FOR SOLUTION INTRAMUSCULAR; INTRAVENOUS
Status: DISPENSED
Start: 2018-10-10

## (undated) RX ORDER — PROPOFOL 10 MG/ML
INJECTION, EMULSION INTRAVENOUS
Status: DISPENSED
Start: 2018-10-10

## (undated) RX ORDER — ONDANSETRON 2 MG/ML
INJECTION INTRAMUSCULAR; INTRAVENOUS
Status: DISPENSED
Start: 2018-10-10

## (undated) RX ORDER — KETAMINE HCL IN 0.9 % NACL 50 MG/5 ML
SYRINGE (ML) INTRAVENOUS
Status: DISPENSED
Start: 2018-10-10

## (undated) RX ORDER — HYDROMORPHONE HYDROCHLORIDE 1 MG/ML
INJECTION, SOLUTION INTRAMUSCULAR; INTRAVENOUS; SUBCUTANEOUS
Status: DISPENSED
Start: 2018-10-10

## (undated) RX ORDER — DEXAMETHASONE SODIUM PHOSPHATE 4 MG/ML
INJECTION, SOLUTION INTRA-ARTICULAR; INTRALESIONAL; INTRAMUSCULAR; INTRAVENOUS; SOFT TISSUE
Status: DISPENSED
Start: 2018-10-10

## (undated) RX ORDER — LIDOCAINE HYDROCHLORIDE 20 MG/ML
INJECTION, SOLUTION EPIDURAL; INFILTRATION; INTRACAUDAL; PERINEURAL
Status: DISPENSED
Start: 2018-10-10